# Patient Record
Sex: MALE | Race: WHITE | Employment: UNEMPLOYED | URBAN - METROPOLITAN AREA
[De-identification: names, ages, dates, MRNs, and addresses within clinical notes are randomized per-mention and may not be internally consistent; named-entity substitution may affect disease eponyms.]

---

## 2021-09-01 ENCOUNTER — HOSPITAL ENCOUNTER (EMERGENCY)
Age: 26
Discharge: HOME | End: 2021-09-01
Attending: STUDENT IN AN ORGANIZED HEALTH CARE EDUCATION/TRAINING PROGRAM
Payer: COMMERCIAL

## 2021-09-01 VITALS
HEART RATE: 85 BPM | BODY MASS INDEX: 37.19 KG/M2 | OXYGEN SATURATION: 100 % | TEMPERATURE: 98.5 F | RESPIRATION RATE: 16 BRPM | WEIGHT: 315 LBS | SYSTOLIC BLOOD PRESSURE: 165 MMHG | DIASTOLIC BLOOD PRESSURE: 108 MMHG | HEIGHT: 77 IN

## 2021-09-01 DIAGNOSIS — K02.9 PAIN DUE TO DENTAL CARIES: Primary | ICD-10-CM

## 2021-09-01 PROCEDURE — 99281 EMR DPT VST MAYX REQ PHY/QHP: CPT

## 2021-09-01 RX ORDER — NAPROXEN 500 MG/1
500 TABLET ORAL 2 TIMES DAILY WITH MEALS
Qty: 20 TABLET | Refills: 0 | Status: SHIPPED | OUTPATIENT
Start: 2021-09-01 | End: 2021-09-01 | Stop reason: SDUPTHER

## 2021-09-01 RX ORDER — PENICILLIN V POTASSIUM 500 MG/1
500 TABLET, FILM COATED ORAL 4 TIMES DAILY
Qty: 28 TABLET | Refills: 0 | Status: SHIPPED | OUTPATIENT
Start: 2021-09-01 | End: 2021-09-08

## 2021-09-01 RX ORDER — PENICILLIN V POTASSIUM 500 MG/1
500 TABLET, FILM COATED ORAL 4 TIMES DAILY
Qty: 28 TABLET | Refills: 0 | Status: SHIPPED | OUTPATIENT
Start: 2021-09-01 | End: 2021-09-01 | Stop reason: SDUPTHER

## 2021-09-01 RX ORDER — NAPROXEN 500 MG/1
500 TABLET ORAL 2 TIMES DAILY WITH MEALS
Qty: 20 TABLET | Refills: 0 | Status: SHIPPED | OUTPATIENT
Start: 2021-09-01 | End: 2021-09-11

## 2021-09-01 NOTE — ED PROVIDER NOTES
Patient is a 27year old male who presents to ED c/o dental pain x4 days. Patient reports he has a history of multiple right lower teeth extracted a few weeks ago. States he has more teeth that need to be pulled but he has not made an appointment yet. Reports pain to multiple teeth specifically right upper side. Has been taking tylenol and ibuprofen w/o relief of pain. States he is followed by [facility] in [place]. He denies fever, chills, drooling, sore throat, difficulty swallowing, facial swelling, neck pain. He has not been on antibiotics for over a month. History reviewed. No pertinent past medical history. History reviewed. No pertinent surgical history. History reviewed. No pertinent family history. Social History     Socioeconomic History    Marital status: SINGLE     Spouse name: Not on file    Number of children: Not on file    Years of education: Not on file    Highest education level: Not on file   Occupational History    Not on file   Tobacco Use    Smoking status: Current Every Day Smoker     Packs/day: 1.00    Smokeless tobacco: Current User   Substance and Sexual Activity    Alcohol use: Not Currently    Drug use: Never    Sexual activity: Not on file   Other Topics Concern    Not on file   Social History Narrative    Not on file     Social Determinants of Health     Financial Resource Strain:     Difficulty of Paying Living Expenses:    Food Insecurity:     Worried About Running Out of Food in the Last Year:     [address] in the Last Year:    Transportation Needs:     Lack of Transportation (Medical):      Lack of Transportation (Non-Medical):    Physical Activity:     Days of Exercise per Week:     Minutes of Exercise per Session:    Stress:     Feeling of Stress :    Social Connections:     Frequency of Communication with Friends and Family:     Frequency of Social Gatherings with Friends and Family:     Attends [employer] Services:  Active Member of Clubs or Organizations:     Attends Club or Organization Meetings:     Marital Status:    Intimate Partner Violence:     Fear of Current or Ex-Partner:     Emotionally Abused:     Physically Abused:     Sexually Abused: ALLERGIES: Patient has no known allergies. Review of Systems   Constitutional: Negative for chills and fever. HENT: Positive for dental problem. Negative for drooling, facial swelling, hearing loss, mouth sores, nosebleeds, postnasal drip, sinus pressure, sinus pain, sore throat, trouble swallowing and voice change. Eyes: Negative for visual disturbance. Respiratory: Negative for cough, shortness of breath and stridor. Musculoskeletal: Negative for neck pain and neck stiffness. Neurological: Negative for headaches. All other systems reviewed and are negative. Vitals:    09/01/21 1812   BP: (!) 165/108   Pulse: 85   Resp: 16   Temp: 98.5 °F (36.9 °C)   SpO2: 100%   Weight: 149.7 kg (330 lb)   Height: 6' 5\" (1.956 m)            Physical Exam  Vitals and nursing note reviewed. Constitutional:       General: He is not in acute distress. Appearance: Normal appearance. He is well-developed. He is not toxic-appearing. HENT:      Head: Normocephalic and atraumatic. Nose: Nose normal.      Mouth/Throat:      Mouth: Mucous membranes are moist.      Dentition: Abnormal dentition. Dental tenderness and dental caries present. Pharynx: Oropharynx is clear. Uvula midline. No oropharyngeal exudate or posterior oropharyngeal erythema. Tonsils: No tonsillar exudate or tonsillar abscesses. Comments: Poor dentition throughout. Multiple teeth with dental decay and multiple fractured teeth. right lower molars have been extracted   Eyes:      General: Lids are normal.      Extraocular Movements: Extraocular movements intact.       Conjunctiva/sclera: Conjunctivae normal.   Cardiovascular:      Rate and Rhythm: Normal rate and regular rhythm. Pulses: Normal pulses. Heart sounds: Normal heart sounds, S1 normal and S2 normal.   Pulmonary:      Effort: Pulmonary effort is normal. No accessory muscle usage. Breath sounds: Normal breath sounds. Abdominal:      Palpations: Abdomen is soft. Musculoskeletal:         General: Normal range of motion. Cervical back: Normal range of motion and neck supple. Skin:     General: Skin is warm and dry. Capillary Refill: Capillary refill takes less than 2 seconds. Neurological:      General: No focal deficit present. Mental Status: He is alert and oriented to person, place, and time. Mental status is at baseline. Psychiatric:         Attention and Perception: Attention normal.         Mood and Affect: Mood and affect normal.         Speech: Speech normal.         Behavior: Behavior is cooperative. Thought Content: Thought content normal.         Cognition and Memory: Cognition normal.         Judgment: Judgment normal.          MDM  Number of Diagnoses or Management Options  Pain due to dental caries  Diagnosis management comments: Offered dental ball which patient declined. Will d/c with naprosyn and MARCELLA roger. Given referral to multiple dental clinics. Patient understands and agrees with plan.         Amount and/or Complexity of Data Reviewed  Discuss the patient with other providers: yes (Dr. Rosales Galeas, ED attending )           Procedures

## 2021-09-01 NOTE — ED TRIAGE NOTES
Pt reports \"all over mouth pain since Saturday. \" Pt reports he has teeth pulled 3-4 weeks ago and has an appointment to have the rest of his teeth pulled in October.

## 2021-09-01 NOTE — DISCHARGE INSTRUCTIONS
Emergency [address]   [address] [facility], [place], [address]   Monday, Wednesday, Friday: 8am-5pm  Tuesday and Thursday: 8am-6pm  Phone: [phone]  $70 for Emergency Care  $60 for first routine care, then pay by sliding scale based upon income      [facility] [address], [place], [address]   Phone: [phone], select option (2) to confirm time for treatment     [facility]  [address] [facility], [place], [address]   Monday-Friday: 8am-4pm  Phone: [phone] of Dentistry Urgent [facility], [address] [facility], [facility]  Phone: [phone] to confirm a time for emergency treatment  Pediatrics: [phone]  $75 per tooth, extractions only     [facility]  [address], [address] [address]   Phone [phone] or [phone]  Hours [id number] (extractions)  Simple tooth extraction: $60 per tooth, $55 per x-ray     [facility] (in [place])  [facility] AT [place] only  Phone: [phone], leave message saying you need an appointment to register  Hours: Wed 6-9p       Non-Urgent [facility] ([facility])  [address] [facility], [place], [place] [address]  Phone: [phone]     [facility] [address] [facility] [address] at [facility]   Dental Clinic: [phone]  Oral Surgery Clinic: [phone]

## 2021-09-01 NOTE — ED NOTES
Discharge instructions reviewed with pt and copy given along with RX by ER PA. Patient verbalized understanding of all education and is ambulatory from ED in no sign of distress or discomfort.